# Patient Record
Sex: MALE | Race: WHITE | Employment: OTHER | URBAN - METROPOLITAN AREA
[De-identification: names, ages, dates, MRNs, and addresses within clinical notes are randomized per-mention and may not be internally consistent; named-entity substitution may affect disease eponyms.]

---

## 2017-02-10 NOTE — PATIENT DISCUSSION
(G50.134) Other secondary cataract, left eye - Assesment : Posterior capsule opacification present. Patient is currently asymptomatic and functioning well. - Plan : Monitor for Changes. Advised patient to call our office with decreased vision or increased symptoms.

## 2017-02-10 NOTE — PATIENT DISCUSSION
(T87.093) Vitreous degeneration, bilateral - Assesment : Examination revealed PVD OU. - Plan : Monitor for changes. Advised patient to call our office with decreased vision or an increase in flashes and/or floaters.

## 2017-07-31 NOTE — PATIENT DISCUSSION
(H10.45) Other chronic allergic conjunctivitis - Assesment : Examination revealed Allergic Conjunctivitis OU. Follicles OU. c/o itching OU. - Plan : Recommend OTC Alaway or Zaditor BID/PRN for itching. Monitor for changes. Advised patient to call our office with decreased vision or an increase in symptoms.

## 2017-07-31 NOTE — PATIENT DISCUSSION
(D98.049) Other secondary cataract, left eye - Assesment : Posterior capsule opacification present. Patient is currently asymptomatic and functioning well. - Plan : Monitor for Changes. Advised patient to call our office with decreased vision or increased symptoms.

## 2017-07-31 NOTE — PATIENT DISCUSSION
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 Advised that headaches are likely not associated with ocular dx and may be associated with sinus troubles. - Plan : Monitor for changes at this time.   RV 6 months Exam.   ***  here with patient today***

## 2017-10-17 ENCOUNTER — ESTABLISHED COMPREHENSIVE EXAM (OUTPATIENT)
Dept: URBAN - METROPOLITAN AREA CLINIC 36 | Facility: CLINIC | Age: 77
End: 2017-10-17

## 2017-10-17 DIAGNOSIS — H04.123: ICD-10-CM

## 2017-10-17 DIAGNOSIS — H35.372: ICD-10-CM

## 2017-10-17 DIAGNOSIS — H25.813: ICD-10-CM

## 2017-10-17 PROCEDURE — 92014 COMPRE OPH EXAM EST PT 1/>: CPT

## 2017-10-17 PROCEDURE — G8785 BP SCRN NO PERF AT INTERVAL: HCPCS

## 2017-10-17 PROCEDURE — 92015 DETERMINE REFRACTIVE STATE: CPT

## 2017-10-17 PROCEDURE — G8427 DOCREV CUR MEDS BY ELIG CLIN: HCPCS

## 2017-10-17 ASSESSMENT — TONOMETRY
OD_IOP_MMHG: 14
OS_IOP_MMHG: 14

## 2017-10-17 ASSESSMENT — VISUAL ACUITY
OS_CC: J3
OD_CC: J5
OD_SC: 20/400
OS_SC: J12
OD_SC: J12
OD_CC: 20/25
OS_SC: 20/200-1
OS_CC: 20/20

## 2019-08-21 NOTE — PATIENT DISCUSSION
(P15.358) Other secondary cataract, left eye - Assesment : Posterior capsule opacification present. Patient is symptomatic with visual function affected. - Plan : Discussed that opacity is the cause of the decreased vision. Discussed the risks and benefits of performing a YAG Capsulotomy including the risk of floaters, retinal detachment, and retinal swelling. Patient understands to expect floaters after the YAG capsulotomy procedures and understands that they may remain indefinitely. Recommend that patient undergo a YAG Capsulotomy OS (Left Eye).   RTC 2 week follow up

## 2019-08-21 NOTE — PATIENT DISCUSSION
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 - Plan : Monitor for changes at this time.     ***  here with patient today***

## 2019-08-21 NOTE — PATIENT DISCUSSION
(H4.363) Vitreous degeneration, right eye - Assesment : Examination revealed a posterior vitreous detachment. - Plan : Handouts given on posterior vitreous detachment and risk factors discussed for retinal detachment development. Advised to call immediately with any changes.

## 2019-09-24 NOTE — PATIENT DISCUSSION
(M21.232) Other secondary cataract, left eye - Assesment : Posterior capsule opacification present. s/p Yag Laser. Vision better    Visto here with patient today*** - Plan : Monitor for changes. Advised patient to call our office with decreased vision or an increase in symptoms. Glasses Rx updated and given.   RTC 6  month follow up

## 2023-01-04 NOTE — PATIENT DISCUSSION
(H11.153) Pinguecula, bilateral - Assesment : Examination revealed Pinguecula. - Plan : Monitor for changes. Advised patient to call our office with decreased vision or increased symptoms. Implemented All Universal Safety Interventions:  [place] to call system. Call bell, personal items and telephone within reach. Instruct patient to call for assistance. Room bathroom lighting operational. Non-slip footwear when patient is off stretcher. Physically safe environment: no spills, clutter or unnecessary equipment. Stretcher in lowest position, wheels locked, appropriate side rails in place.

## 2023-12-04 NOTE — PATIENT DISCUSSION
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 - Plan : Decrease pred forte to use every 2-3 days OS for 1 month then d/c. If patient runs out ok to D/c. Monitor for changes.   RV 6 months follow up.  ***  here with patient today*** - Chronic  - At home on atorvastatin 80mg, continue this